# Patient Record
Sex: MALE | Race: WHITE | NOT HISPANIC OR LATINO
[De-identification: names, ages, dates, MRNs, and addresses within clinical notes are randomized per-mention and may not be internally consistent; named-entity substitution may affect disease eponyms.]

---

## 2019-01-24 ENCOUNTER — TRANSCRIPTION ENCOUNTER (OUTPATIENT)
Age: 29
End: 2019-01-24

## 2021-03-10 ENCOUNTER — TRANSCRIPTION ENCOUNTER (OUTPATIENT)
Age: 31
End: 2021-03-10

## 2021-10-27 ENCOUNTER — TRANSCRIPTION ENCOUNTER (OUTPATIENT)
Age: 31
End: 2021-10-27

## 2023-10-26 ENCOUNTER — APPOINTMENT (OUTPATIENT)
Dept: UROLOGY | Facility: CLINIC | Age: 33
End: 2023-10-26

## 2023-11-30 ENCOUNTER — APPOINTMENT (OUTPATIENT)
Dept: ORTHOPEDIC SURGERY | Facility: CLINIC | Age: 33
End: 2023-11-30
Payer: COMMERCIAL

## 2023-11-30 ENCOUNTER — NON-APPOINTMENT (OUTPATIENT)
Age: 33
End: 2023-11-30

## 2023-11-30 VITALS — WEIGHT: 250 LBS | HEIGHT: 67 IN | BODY MASS INDEX: 39.24 KG/M2

## 2023-11-30 PROBLEM — Z00.00 ENCOUNTER FOR PREVENTIVE HEALTH EXAMINATION: Status: ACTIVE | Noted: 2023-11-30

## 2023-11-30 PROCEDURE — 99203 OFFICE O/P NEW LOW 30 MIN: CPT

## 2023-12-16 ENCOUNTER — NON-APPOINTMENT (OUTPATIENT)
Age: 33
End: 2023-12-16

## 2023-12-28 ENCOUNTER — APPOINTMENT (OUTPATIENT)
Dept: ORTHOPEDIC SURGERY | Facility: CLINIC | Age: 33
End: 2023-12-28
Payer: COMMERCIAL

## 2023-12-28 DIAGNOSIS — S62.632A DISPLACED FRACTURE OF DISTAL PHALANX OF RIGHT MIDDLE FINGER, INITIAL ENCOUNTER FOR CLOSED FRACTURE: ICD-10-CM

## 2023-12-28 PROCEDURE — 99213 OFFICE O/P EST LOW 20 MIN: CPT

## 2023-12-28 PROCEDURE — 73140 X-RAY EXAM OF FINGER(S): CPT | Mod: RT

## 2023-12-28 NOTE — ASSESSMENT
[FreeTextEntry1] : 33-year-old male here status post 3 weeks following closed displaced fracture of the distal phalanx of the right middle finger.  Reports his pain is well-controlled and he is doing well overall at this time.  The ecchymosis has improved with.  Denies any numbness or tingling.  Physical examination of right middle finger: No swelling or erythema appreciated.  Mild ecchymosis appreciated under the nailbed.  No extensor lag.  Full range of motion.  Can fully flex and extend at the DIP joint.  Nontender to palpation.  No signs of infection.  Active bleeding or discharge.  Sensorimotor intact distally.  Neuro vas intact.  X-rays of the right middle finger taken in the office today: Closed displaced distal phalanx fracture with routine healing x-rays left alignment fracture pattern.  Patient is doing well overall and his pain is well-controlled.  Denies any numbness or tingling.  Encouraged to continue with gentle range of motion activity modification.  He has returned to work without any restrictions.  Red flag symptoms discussed.  X-rays are discussed.  He will call with any questions or concerns.  Otherwise he will follow-up as needed moving forward since he is doing well. . All questions and concerns addressed to patient's satisfaction. Patient expresses full understanding of treatment plan.